# Patient Record
Sex: FEMALE | Race: WHITE
[De-identification: names, ages, dates, MRNs, and addresses within clinical notes are randomized per-mention and may not be internally consistent; named-entity substitution may affect disease eponyms.]

---

## 2018-04-20 NOTE — EDM.PDOC
ED HPI GENERAL MEDICAL PROBLEM





- General


Chief Complaint: Abdominal Pain


Stated Complaint: ABDOMINAL PAIN


Time Seen by Provider: 18 07:31


Source of Information: Reports: Patient


History Limitations: Reports: No Limitations





- History of Present Illness


INITIAL COMMENTS - FREE TEXT/NARRATIVE: 





The patient states that she was seen by her PCP on 3/29/2018 for an annual 

checkup, but was also with a complaint of pain across her upper abdomen and 

down the left side for several months. She states that she was prescribed two 

medicines - the names of which she does not recall - that she took 2 doses of, 

total. These medicines were prescribed "to heal an ulcer plus something to 

protect my tummy". She underwent a CT scan of the abdomen and pelvis with oral 

contrast only on 2018, which reportedly demonstrated gallstones. She states 

that she was notified of this by the nurse for her PCP, who told her that she 

would need to have her gallbladder removed. She was referred to see a surgeon 

at [facility] this coming Tuesday, 2018. She states that when she was 

told that she has gallstones, she did not see a need to continue the medicines 

that she was prescribed.





A copy of the CT scan of her abdomen with oral contrast, dated 2018 has 

been obtained. It finds:


1. Cholelithiasis.


2. Diffuse fatty infiltration of the liver.


3. 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





There is no mention of findings such as diverticulitis, pericholecystic fluid, 

dilated common bile duct, free air, etc.





She now presents with continuation of pain across her upper abdomen and down 

the left side. No nausea, emesis, constipation, or urinary symptoms. She states 

that she has frequent watery diarrhea, normal for her. No recent fever.





The patient's PCP is Brittney Sam.








  ** Bilateral Upper Abdomen


Pain Score (Numeric/FACES): 8





- Related Data


 Allergies











Allergy/AdvReac Type Severity Reaction Status Date / Time


 


Iodinated Contrast- Oral and Allergy  Hives Verified 18 07:25





IV Dye     











Home Meds: 


 Home Meds





traZODone 50 mg PO DAILY 16 [History]











Past Medical History


HEENT History: Reports: Impaired Vision


Cardiovascular History: Reports: High Cholesterol (untreated)


Psychiatric History: Reports: Other (See Below) (Insomnia)





- Past Surgical History


HEENT Surgical History: Reports: Oral Surgery ([place] teeth extraction), 

Tonsillectomy


GI Surgical History: Reports: Appendectomy


Female  Surgical History: Reports:  Section (x 2), Hysterectomy, 

Nephrectomy (left, secondary to being non-functional)





Social & Family History





- Tobacco Use


Smoking Status *Q: Current Every Day Smoker


Years of Tobacco use: 34


Packs/Tins Daily: 0.5





- Caffeine Use


Caffeine Use: Reports: None





- Alcohol Use


Alcohol Use History: Yes


Days Per Week of Alcohol Use: 7


Number of Drinks Per Day: 4


Total Drinks Per Week: 28


Alcohol Use Frequency: Daily





- Recreational Drug Use


Recreational Drug Use: Yes


Drug Use in Last 12 Months: No


Recreational Drug Type: Reports: Marijuana/Hashish (none since late )





- Living Situation & Occupation


Living situation: Reports: , with Family (2 kids)


Occupation: Employed (Customer service at [employer])





ED ROS GENERAL





- Review of Systems


Review Of Systems: ROS reveals no pertinent complaints other than HPI.





ED EXAM, GI/ABD





- Physical Exam


Exam: See Below


Exam Limited By: No Limitations


General Appearance: Alert, WD/WN, No Apparent Distress


Eyes: Bilateral: Normal Appearance, EOMI


Ears: Normal External Exam, Hearing Grossly Normal


Nose: Normal Inspection, No Blood


Throat/Mouth: Normal Inspection, Normal Lips, Normal Voice, No Airway Compromise


Head: Atraumatic, Normocephalic


Neck: Normal Inspection, Full Range of Motion


Respiratory/Chest: No Respiratory Distress, Lungs Clear, Normal Breath Sounds, 

No Accessory Muscle Use


Cardiovascular: Normal Peripheral Pulses, Regular Rate, Rhythm, No Edema, No 

Gallop, No JVD, No Murmur, No Rub


GI/Abdominal Exam: Normal Bowel Sounds, Soft, No Organomegaly, No Distention, 

No Abnormal Bruit, No Mass, Tender (Primarily to the left lower quadrant and 

somewhat to the suprapubic area, right lower quadrant and epigastrium. Minimal 

tenderness to the right upper quadrant, and nontender elsewhere.)


 (Female) Exam: Deferred


Rectal (Female) Exam: Deferred


Back Exam: Normal Inspection, Full Range of Motion, Other (Well-healed 

nephrectomy scar on the left flank).  No: CVA Tenderness (L), CVA Tenderness (R)


Extremities: Normal Inspection, Normal Range of Motion, No Pedal Edema, Normal 

Capillary Refill


Neurological: Alert, Oriented, Normal Cognition, No Motor/Sensory Deficits


Psychiatric: Normal Affect


Skin Exam: Warm, Dry, Intact, Normal Color, No Rash





Course





- Vital Signs


Last Recorded V/S: 


 Last Vital Signs











Temp  36.2 C   18 07:22


 


Pulse  101 H  18 07:22


 


Resp  18   18 07:22


 


BP  126/86   18 07:22


 


Pulse Ox  94 L  18 07:22














- Orders/Labs/Meds


Orders: 


 Active Orders 24 hr











 Category Date Time Status


 


 UA W/MICROSCOPIC [URIN] Stat Lab  18 08:10 Ordered











Labs: 


 Laboratory Tests











  18 Range/Units





  08:10 08:15 08:15 


 


WBC   5.93   (3.98-10.04)  K/mm3


 


RBC   4.56   (3.98-5.22)  M/mm3


 


Hgb   14.4   (11.2-15.7)  gm/L


 


Hct   43.3   (34.1-44.9)  %


 


MCV   95.0 H   (79.4-94.8)  fl


 


MCH   31.6   (25.6-32.2)  pg


 


MCHC   33.3   (32.2-35.5)  g/dl


 


RDW Std Deviation   44.6   (36.4-46.3)  fL


 


Plt Count   284   (182-369)  K/mm3


 


MPV   9.4   (9.4-12.3)  fl


 


Neutrophils % (Manual)   54   (40-60)  %


 


Band Neutrophils %   0   (0-10)  %


 


Lymphocytes % (Manual)   40   (20-40)  %


 


Atypical Lymphs %   0   %


 


Monocytes % (Manual)   3   (2-10)  %


 


Eosinophils % (Manual)   2   (0.7-5.8)  %


 


Basophils % (Manual)   1   (0.1-1.2)  


 


Platelet Estimate   Adequate   


 


RBC Morph Comment   Normal   


 


Sodium    141  (136-145)  mEq/L


 


Potassium    4.3  (3.5-5.1)  mEq/L


 


Chloride    105  ()  mEq/L


 


Carbon Dioxide    26  (21-32)  mEq/L


 


Anion Gap    14.3  (5-15)  


 


BUN    15  (7-18)  mg/dL


 


Creatinine    0.9  (0.55-1.02)  mg/dL


 


Est Cr Clr Drug Dosing    63.14  mL/min


 


Estimated GFR (MDRD)    > 60  (>60)  mL/min


 


BUN/Creatinine Ratio    16.7  (14-18)  


 


Glucose    108 H  ()  mg/dL


 


Calcium    9.3  (8.5-10.1)  mg/dL


 


Total Bilirubin    0.3  (0.2-1.0)  mg/dL


 


AST    27  (15-37)  U/L


 


ALT    38  (14-59)  U/L


 


Alkaline Phosphatase    49  ()  U/L


 


Total Protein    7.3  (6.4-8.2)  g/dl


 


Albumin    3.9  (3.4-5.0)  g/dl


 


Globulin    3.4  gm/dL


 


Albumin/Globulin Ratio    1.2  (1-2)  


 


Lipase    172  ()  U/L


 


Urine Color  Yellow    (Yellow)  


 


Urine Appearance  Clear    (Clear)  


 


Urine pH  5.5    (5.0-8.0)  


 


Ur Specific Gravity  > or = 1.030    (1.005-1.030)  


 


Urine Protein  Negative    (Negative)  


 


Urine Glucose (UA)  Negative    (Negative)  


 


Urine Ketones  Negative    (Negative)  


 


Urine Occult Blood  Trace-intact H    (Negative)  


 


Urine Nitrite  Negative    (Negative)  


 


Urine Bilirubin  Negative    (Negative)  


 


Urine Urobilinogen  0.2    (0.2-1.0)  


 


Ur Leukocyte Esterase  Negative    (Negative)  


 


Urine RBC  5-10 H    (0-5)  /hpf


 


Urine WBC  0-5    (0-5)  /hpf


 


Ur Epithelial Cells  0-5    (0-5)  /hpf


 


Urine Bacteria  Few    (FEW)  /hpf


 


Urine Mucus  Few    (FEW)  /hpf














- Re-Assessments/Exams


Free Text/Narrative Re-Assessment/Exam: 





18 09:23


Test results discussed with the patient. Today's CBC, CMP, lipase level, and 

urinalysis are all completely normal. Based on these test results, her current 

physical examination, in addition to the previous CT scan, I do not see an 

indication for repeat CT scan at this time. I'm recommending that the patient 

follow-up with the surgeon at her previously scheduled appointment this coming 

Tuesday, 2018. The patient is agreeable.





Departure





- Departure


Time of Disposition: 09:24


Disposition: Home, Self-Care 01


Condition: Good


Clinical Impression: 


 Chronic abdominal pain








- Discharge Information


Referrals: 


Brittney Sam PA-C [Primary Care Provider] - 


Forms:  ED Department Discharge


Additional Instructions: 


You were seen in the emergency room for continued upper and left-sided 

abdominal pain.





Workup in the ER included blood work and a urinalysis, all of which were 

completely normal. There is no sign of infection. You do not have pancreatitis. 

Your electrolytes are normal, and your kidney function is normal. You do not 

have a urinary tract infection.





Because you had an unremarkable CT scan on 2018, and based on your history 

and current physical examination, a repeat CT scan was not recommended.





We recommend that you follow-up with the surgeon that you are currently 

scheduled to see this coming Tuesday, 2018.





If any other problems, please do not hesitate to return to the ER.





- My Orders


Last 24 Hours: 


My Active Orders





18 08:10


UA W/MICROSCOPIC [URIN] Stat 














- Assessment/Plan


Last 24 Hours: 


My Active Orders





18 08:10


UA W/MICROSCOPIC [URIN] Stat

## 2019-02-12 NOTE — EDM.PDOC
ED HPI GENERAL MEDICAL PROBLEM





- General


Chief Complaint: Abdominal Pain


Stated Complaint: LOWER ABDOMINAL PAIN


Time Seen by Provider: 19 23:36


Source of Information: Reports: Patient


History Limitations: Reports: No Limitations





- History of Present Illness


INITIAL COMMENTS - FREE TEXT/NARRATIVE: 


53-year-old female presents to the ED after wakening from sleep with severe 

lower abdominal pain and rectal pressure discomfort like she had to have a 

bowel movement but was unable to do so. States she ate a hamburger about 1930 

hrs. last evening and thought perhaps to meet didn't taste quite right. She 

thought maybe a hamburger was in the fridge for to long. She's had no nausea 

vomiting although she was quite nauseated when the pain was very intense. At 

present the pain has dissipated nearly completely and she states now she wishes 

she would not come to the hospital. Placement us ace smell of alcohol on her 

breath. Previous appendectomy and cholecystectomy. History of past problems 

with constipation. She states normally her bowels are moving twice a day 

however.





Onset: Today


Onset Date: 19


Onset Time: 22:35


Duration: Minutes:


Location: Reports: Abdomen (Diffuse lower abdominal strong cramping pain with 

rectal pressure discomfort that is now eased up.)


Quality: Reports: Ache, Pressure, Sharp, Stabbing, Other (: Colicky component 

to the pain)


Severity: Severe (Pain was reportedly tender to 10 but is now gone)


Improves with: Reports: Other (Improves spontaneously.)


Worsens with: Reports: None


Context: Denies: Activity, Exercise, Lifting, Sick Contact, Trauma, Other


Associated Symptoms: Reports: Nausea/Vomiting.  Denies: No Other Symptoms, 

Confusion, Chest Pain, Cough, cough w sputum, Diaphoresis, Fever/Chills, 

Headaches, Loss of Appetite, Malaise, Rash, Seizure (Nausea without vomiting), 

Shortness of Breath, Syncope


Treatments PTA: Reports: Other (see below) (None.)


  ** Lower Abdomen


Pain Score (Numeric/FACES): 10





- Related Data


 Allergies











Allergy/AdvReac Type Severity Reaction Status Date / Time


 


Iodinated Contrast- Oral and Allergy  Hives Verified 19 23:28





IV Dye     











Home Meds: 


 Home Meds





traZODone 50 mg PO DAILY 16 [History]











Past Medical History


HEENT History: Reports: Impaired Vision


Cardiovascular History: Reports: High Cholesterol


Musculoskeletal History: Reports: Arthritis


Neurological History: Reports: Other (See Below)


Psychiatric History: Reports: Other (See Below)





- Past Surgical History


HEENT Surgical History: Reports: Oral Surgery, Tonsillectomy


GI Surgical History: Reports: Appendectomy


Female  Surgical History: Reports:  Section, Hysterectomy, Nephrectomy





Social & Family History





- Caffeine Use


Caffeine Use: Reports: None





- Living Situation & Occupation


Living situation: Reports: , with Family (2 kids)


Occupation: Employed (Customer service at [employer])





ED ROS GENERAL





- Review of Systems


Review Of Systems: See Below


Constitutional: Denies: Fever, Chills, Malaise, Weakness, Fatigue, Weight Loss


HEENT: Reports: No Symptoms


Respiratory: Reports: Shortness of Breath, Wheezing (On exertion), Cough ( 

occasional wheezing she is a smokeroccasional cough )


Cardiovascular: Reports: No Symptoms


Endocrine: Reports: No Symptoms


GI/Abdominal: Reports: Abdominal Pain, Constipation (See history of present 

illness [place] problems with constipation)


: Reports: No Symptoms


Musculoskeletal: Reports: No Symptoms


Skin: Reports: No Symptoms


Neurological: Reports: Other (Chronic insomnia)


Hematologic/Lymphatic: Reports: No Symptoms


Immunologic: Reports: No Symptoms





ED EXAM, GI/ABD





- Physical Exam


Exam: See Below


Exam Limited By: No Limitations


General Appearance: Alert, WD/WN, No Apparent Distress, Anxious, Mild Distress, 

Other (But prefers to stay curled up in the fetal position and won't go to 

sleep. Of note she did take trazodone before going to bed tonight)


Eyes: Bilateral: Normal Appearance


Respiratory/Chest: Lungs Clear, Normal Breath Sounds, No Accessory Muscle Use, 

Respiratory Distress, Decreased Breath Sounds (Breath sounds are slightly 

diminished in both lower lung fields compatible with mild emphysema changes.)


Cardiovascular: Normal Peripheral Pulses, Regular Rate, Rhythm, No Edema, No 

Murmur, No Rub


GI/Abdominal Exam: Tender, Abnormal Bowel Sounds (Hyperactive bowel sounds in 

all 4 quadrants.), Other (Evidence of previous cholecystectomy and open 

appendectomy).  No: Guarding, Rigid (Slight tenderness suprapubically and right 

lower quadrant with no guarding or rebound), Rebound


Back Exam: Normal Inspection, Full Range of Motion.  No: CVA Tenderness (L), 

CVA Tenderness (R)


Extremities: Normal Inspection, Normal Range of Motion, Non-Tender, No Pedal 

Edema


Neurological: Alert, Oriented, CN II-XII Intact, Normal Cognition


Psychiatric: Normal Affect, Normal Mood


Skin Exam: Warm, Dry, Intact, Normal Color, No Rash





Course





- Vital Signs


Last Recorded V/S: 


 Last Vital Signs











Temp  36.2 C   19 23:23


 


Pulse  93   19 23:23


 


Resp  20   19 23:23


 


BP  137/81   19 23:23


 


Pulse Ox  93 L  19 23:23














- Orders/Labs/Meds


Meds: 


Medications














Discontinued Medications














Generic Name Dose Route Start Last Admin





  Trade Name Debbie  PRN Reason Stop Dose Admin


 


Dicyclomine HCl  20 mg  19 00:08  19 00:16





  Bentyl  PO  19 00:09  20 mg





  ONETIME ONE   Administration





     





     





     





     


 


Magnesium Citrate  150 ml  19 00:14  19 00:25





  Citrate Of Magnesia  PO  19 00:15  150 ml





  ONETIME ONE   Administration





     





     





     





     














- Radiology Interpretation


Free Text/Narrative:: 


53-year-old female presents to the ED after wakening from sleep with severe 

lower abdominal pain with rectal pressure discomfort. She felt like she had to 

have a bowel movement but was unable to do so. Pain was intense strongly 

colicky and is subsequently eased up since she came to the ED. Examination 

reveals a benign abdomen with hyperactive bowel sounds in all 4 quadrants. 

Suspect problems with constipation. Plan KUB and urinalysis to be obtained








- Re-Assessments/Exams


Free Text/Narrative Re-Assessment/Exam: 





19 00:09 came in the reveals a large amount of air throughout the colon 

ends a few clumps of stool throughout the colon and particularly down the 

rectal vault. It is  not really bad enough to warrant bowel cleanse. Going to 

give her Bentyl 20 mg by mouth that she does have some mild colicky pain at 

this time. I think if she sleeps well with relief of abdominal pain to pass a 

good portion of this air out of her colon and be fine in the morning. I will 

send her home with Citroma to take 5 ounces in the morning with 5 ounces of 

juice of choice to provide bowel cleanse.











Departure





- Departure


Time of Disposition: 00:12


Disposition: Home, Self-Care 01


Condition: Fair


Clinical Impression: 


 Constipation by delayed colonic transit





Abdominal pain


Qualifiers:


 Abdominal location: lower abdomen, unspecified Qualified Code(s): R10.30 - 

Lower abdominal pain, unspecified








- Discharge Information


*PRESCRIPTION DRUG MONITORING PROGRAM REVIEWED*: Not Applicable


*COPY OF PRESCRIPTION DRUG MONITORING REPORT IN PATIENT BRODY: Not Applicable


Instructions:  Constipation, Adult


Referrals: 


Brittney Sam PA-C [Primary Care Provider] - 


Forms:  ED Department Discharge, ED Return to Work/School Form


Additional Instructions: 


Evaluation the emergency room tonight in regards to development of acute lower 

abdominal cramping pain with a feeling of need to defecate but unable to do so. 

By the time he arrived in the ED and were seen the pain had pretty well 

resolved. However it is coming back mildly. Examination reveals a benign 

abdomen with very frequent bowel sounds. X-ray of the abdomen reveals a large 

amount of air distending the colon with collapse of stool throughout the colon 

compatible with mild constipation. At this time I would suggest Bentyl 20 mg by 

mouth which was given in the ED to relieve abdominal cramping pain. May use 

Citroma in the morning or magnesium citrate 5 ounces by mouth mixed with 5 

ounces of juice of choice to provide further bowel cleanse. Note given to 

excuse her from the workplace today due to current illness.

## 2019-02-13 NOTE — CR
Abdomen: Supine view of the abdomen was obtained.

 

Comparison: No prior abdominal plain film, previous CT abdomen and 

pelvis exam of 09/02/09.

 

Absent left kidney shadow is noted.  Surgical clips are seen within 

the upper right abdomen.  This presumably is from previous 

cholecystectomy.  Calcifications are seen within the pelvis which are 

compatible with phleboliths.  Bowel gas pattern is normal.  Bony 

structures are within normal limits for the patient's age.

 

Impression:

1.  Incidental findings as noted above.  Nothing acute is seen on 

supine abdominal x-ray.

 

Diagnostic code #2

## 2019-09-18 ENCOUNTER — HOSPITAL ENCOUNTER (EMERGENCY)
Dept: HOSPITAL 41 - JD.ED | Age: 54
Discharge: HOME | End: 2019-09-18
Payer: MEDICAID

## 2019-09-18 VITALS — HEART RATE: 130 BPM | SYSTOLIC BLOOD PRESSURE: 116 MMHG | DIASTOLIC BLOOD PRESSURE: 71 MMHG

## 2019-09-18 DIAGNOSIS — Z88.8: ICD-10-CM

## 2019-09-18 DIAGNOSIS — F17.210: ICD-10-CM

## 2019-09-18 DIAGNOSIS — Z90.710: ICD-10-CM

## 2019-09-18 DIAGNOSIS — Z98.890: ICD-10-CM

## 2019-09-18 DIAGNOSIS — Z88.1: ICD-10-CM

## 2019-09-18 DIAGNOSIS — Z91.041: ICD-10-CM

## 2019-09-18 DIAGNOSIS — Z79.899: ICD-10-CM

## 2019-09-18 DIAGNOSIS — Z90.5: ICD-10-CM

## 2019-09-18 DIAGNOSIS — J40: Primary | ICD-10-CM

## 2019-09-18 PROCEDURE — 94640 AIRWAY INHALATION TREATMENT: CPT

## 2019-09-18 PROCEDURE — 71046 X-RAY EXAM CHEST 2 VIEWS: CPT

## 2019-09-18 PROCEDURE — 99285 EMERGENCY DEPT VISIT HI MDM: CPT

## 2019-09-18 NOTE — CR
Chest:  Two views of the chest were obtained.

 

Comparison: No prior chest imaging.

 

Heart size and mediastinum are normal.  Lungs are clear.  Minimal 

scoliosis is noted within the spine.  Surgical clips are noted within 

the upper right abdomen from previous cholecystectomy.

 

Impression:

1.  Incidental findings.  Nothing acute is seen.

 

Diagnostic code #2

## 2019-09-18 NOTE — EDM.PDOC
ED HPI GENERAL MEDICAL PROBLEM





- General


Chief Complaint: Respiratory Problem


Stated Complaint: cough


Time Seen by Provider: 19 00:13


Source of Information: Reports: Patient


History Limitations: Reports: No Limitations





- History of Present Illness


INITIAL COMMENTS - FREE TEXT/NARRATIVE: 





The patient has had a cough for over 2 weeks.  This all started the  with ear pressure and sore throat and it advanced to a cough.  She 

[facility] and given a Z-elizabeth.  She did not get better so 

she saw her doctor in the clinic and she was given another antibiotic, steroids 

and albuterol.  That still has not helped.  She has some shortness of breath.  

She does smoke.  She does have some left sided chest pain from coughing.  She 

has no abdominal pain, nausea or vomiting.


Onset: Gradual


Duration: Week(s):


Location: Reports: Chest


Quality: Reports: Sharp


Severity: Moderate


Improves with: Reports: None


Worsens with: Reports: Other (cough)


Associated Symptoms: Reports: Chest Pain, Cough, Shortness of Breath.  Denies: 

Fever/Chills, Headaches, Nausea/Vomiting


  ** left rib cage


Pain Score (Numeric/FACES): 8





- Related Data


 Allergies











Allergy/AdvReac Type Severity Reaction Status Date / Time


 


amoxicillin Allergy  Rash Verified 19 00:15


 


hydrocortisone Allergy  Rash Verified 19 00:15


 


Iodinated Contrast Media Allergy  Hives Verified 19 23:28





[Iodinated Contrast- Oral     





and IV Dye]     











Home Meds: 


 Home Meds





traZODone 50 mg PO DAILY 16 [History]


Celecoxib [CeleBREX]  19 [History]


Codeine/Promethazine [Phenergan with Codeine] 10 ml PO Q4HR PRN #300 ml  [Rx]


FLUoxetine [PROzac]  19 [History]











Past Medical History


HEENT History: Reports: Impaired Vision


Cardiovascular History: Reports: High Cholesterol


Musculoskeletal History: Reports: Arthritis


Neurological History: Reports: Other (See Below)


Psychiatric History: Reports: Other (See Below)





- Past Surgical History


HEENT Surgical History: Reports: Oral Surgery, Tonsillectomy


GI Surgical History: Reports: Appendectomy


Female  Surgical History: Reports:  Section, Hysterectomy, Nephrectomy





Social & Family History





- Tobacco Use


Smoking Status *Q: Current Every Day Smoker


Years of Tobacco use: 30


Packs/Tins Daily: 0.5





- Caffeine Use


Caffeine Use: Reports: None





- Living Situation & Occupation


Living situation: Reports: , with Family (2 kids)


Occupation: Employed (Customer service at [employer])





ED ROS GENERAL





- Review of Systems


Review Of Systems: See Below


Constitutional: Reports: No Symptoms


HEENT: Reports: No Symptoms


Respiratory: Reports: Shortness of Breath, Cough


Cardiovascular: Reports: No Symptoms


Endocrine: Reports: No Symptoms


GI/Abdominal: Reports: No Symptoms


: Reports: No Symptoms


Musculoskeletal: Reports: No Symptoms


Skin: Reports: No Symptoms





ED EXAM, GENERAL





- Physical Exam


Exam: See Below


Exam Limited By: No Limitations


General Appearance: Alert, No Apparent Distress


Ears: Normal External Exam


Nose: Normal Inspection


Head: Atraumatic, Normocephalic


Neck: Normal Inspection


Respiratory/Chest: No Respiratory Distress, Decreased Breath Sounds, Wheezing


Cardiovascular: Regular Rate, Rhythm, No Edema, No Murmur


GI/Abdominal: Soft, Non-Tender, No Organomegaly, No Mass


Back Exam: Normal Inspection


Extremities: Normal Inspection


Neurological: Alert, Oriented, No Motor/Sensory Deficits





Course





- Vital Signs


Last Recorded V/S: 


 Last Vital Signs











Temp  98.5 F   19 00:12


 


Pulse  130 H  19 00:12


 


Resp  18   19 00:12


 


BP  116/71   19 00:12


 


Pulse Ox  94 L  19 00:26














- Orders/Labs/Meds


Orders: 


 Active Orders 24 hr











 Category Date Time Status


 


 RT Aerosol Therapy [RC] ASDIRECTED Care  19 00:26 Active


 


 CXR [Chest 2V] [CR] Stat Exams  19 00:27 Taken











Meds: 


Medications














Discontinued Medications














Generic Name Dose Route Start Last Admin





  Trade Name Freq  PRN Reason Stop Dose Admin


 


Albuterol/Ipratropium  3 ml  19 00:26  19 00:36





  Duoneb 3.0-0.5 Mg/3 Ml  NEB  19 00:27  3 ml





  ONETIME ONE   Administration





     





     





     





     


 


Promethazine HCl/Codeine  10 ml  19 00:26  19 00:31





  Phenergan With Codeine  PO  19 00:27  10 ml





  ONETIME ONE   Administration





     





     





     





     














- Re-Assessments/Exams


Free Text/Narrative Re-Assessment/Exam: 





19 00:37


I ordered a CXR, labs, duoneb and phenergan with codeine.


19 00:52


Her CXR looks good.  I will discharge her home on some phenergan with codeine.





Departure





- Departure


Time of Disposition: 01:00


Disposition: Home, Self-Care 01


Condition: Good


Clinical Impression: 


 Bronchitis








- Discharge Information


*PRESCRIPTION DRUG MONITORING PROGRAM REVIEWED*: No


*COPY OF PRESCRIPTION DRUG MONITORING REPORT IN PATIENT BRODY: No


Prescriptions: 


Codeine/Promethazine [Phenergan with Codeine] 10 ml PO Q4HR PRN #300 ml


 PRN Reason: Cough


Referrals: 


Brittney Sam PA-C [Primary Care Provider] - 1 Week


Forms:  ED Department Discharge


Additional Instructions: 


Keep taking your antibiotic.  Take the phenergan with codiene every 4 to 6 

hours as needed for cough.  Use the albuterol inhaler 2 puffs every 4 to 6 

hours as needed for shortness of breath.  Please return if you are worse.  Stop 

smoking.





- My Orders


Last 24 Hours: 


My Active Orders





19 00:26


RT Aerosol Therapy [RC] ASDIRECTED 





19 00:27


CXR [Chest 2V] [CR] Stat 














- Assessment/Plan


Last 24 Hours: 


My Active Orders





19 00:26


RT Aerosol Therapy [RC] ASDIRECTED 





19 00:27


CXR [Chest 2V] [CR] Stat

## 2020-02-25 ENCOUNTER — HOSPITAL ENCOUNTER (EMERGENCY)
Dept: HOSPITAL 41 - JD.ED | Age: 55
Discharge: NURSING, REHAB OR LONG-TERM CARE | End: 2020-02-25
Payer: MEDICAID

## 2020-02-25 VITALS — DIASTOLIC BLOOD PRESSURE: 92 MMHG | SYSTOLIC BLOOD PRESSURE: 119 MMHG

## 2020-02-25 VITALS — HEART RATE: 91 BPM

## 2020-02-25 DIAGNOSIS — R94.31: ICD-10-CM

## 2020-02-25 DIAGNOSIS — I21.4: Primary | ICD-10-CM

## 2020-02-25 DIAGNOSIS — F17.210: ICD-10-CM

## 2020-02-25 DIAGNOSIS — R73.9: ICD-10-CM

## 2020-02-25 DIAGNOSIS — Z91.041: ICD-10-CM

## 2020-02-25 DIAGNOSIS — Z88.0: ICD-10-CM

## 2020-02-25 PROCEDURE — 84484 ASSAY OF TROPONIN QUANT: CPT

## 2020-02-25 PROCEDURE — 85730 THROMBOPLASTIN TIME PARTIAL: CPT

## 2020-02-25 PROCEDURE — 96375 TX/PRO/DX INJ NEW DRUG ADDON: CPT

## 2020-02-25 PROCEDURE — 71046 X-RAY EXAM CHEST 2 VIEWS: CPT

## 2020-02-25 PROCEDURE — 80053 COMPREHEN METABOLIC PANEL: CPT

## 2020-02-25 PROCEDURE — 83735 ASSAY OF MAGNESIUM: CPT

## 2020-02-25 PROCEDURE — 99285 EMERGENCY DEPT VISIT HI MDM: CPT

## 2020-02-25 PROCEDURE — 96368 THER/DIAG CONCURRENT INF: CPT

## 2020-02-25 PROCEDURE — 85379 FIBRIN DEGRADATION QUANT: CPT

## 2020-02-25 PROCEDURE — 36415 COLL VENOUS BLD VENIPUNCTURE: CPT

## 2020-02-25 PROCEDURE — 93005 ELECTROCARDIOGRAM TRACING: CPT

## 2020-02-25 PROCEDURE — 84443 ASSAY THYROID STIM HORMONE: CPT

## 2020-02-25 PROCEDURE — 96365 THER/PROPH/DIAG IV INF INIT: CPT

## 2020-02-25 PROCEDURE — 96376 TX/PRO/DX INJ SAME DRUG ADON: CPT

## 2020-02-25 PROCEDURE — 85025 COMPLETE CBC W/AUTO DIFF WBC: CPT

## 2020-02-25 NOTE — EDM.PDOC
ED HPI GENERAL MEDICAL PROBLEM





- General


Chief Complaint: Cardiovascular Problem


Stated Complaint: RACING HEART


Time Seen by Provider: 20 16:04


Source of Information: Reports: Patient


History Limitations: Reports: No Limitations





- History of Present Illness


INITIAL COMMENTS - FREE TEXT/NARRATIVE: 





Ms. Curiel is a very pleasant 54-year-old woman with a past medical history 

significant for untreated dyslipidemia, insomnia, and hepatic steatosis, who 

now presents to the ED stating that she has had rapid palpitations for the past 

2 or 3 days, whenever she moves around or gets up.  No chest pain.  She also 

reports feeling warm or cold at times, including hot flashes with diaphoresis 

on and off for the past few years, especially for the past 2 nights.  She is 

having a hot flash here in the ED, but states that most of her hot flashes 

occur at night.  She also relates dyspnea that she believes is due to anxiety, 

and lightheadedness.  She also relates epigastric burning, but only when she 

gets up and moves around.





The patient states that she has had similar symptoms, about twice a year over 

the past 2 years.  She is status post a partial hysterectomy.  She states that 

her PCP told her that her hot flashes were not due to menopause, therefore the 

patient is not on hormone replacement therapy.





Here in the ED, the patient is found to be tachycardic at 145 bpm, but is 

otherwise hemodynamically stable, saturating 97% on room air.








The patient's PCP is SOILA Reddy.


She is unsure if she received an influenza vaccine this season, but agreed to 

receive one here today.











- Related Data


 Allergies











Allergy/AdvReac Type Severity Reaction Status Date / Time


 


amoxicillin Allergy  Rash Verified 20 16:08


 


hydrocortisone Allergy  Rash Verified 20 16:08


 


Iodinated Contrast Media Allergy  Hives Verified 20 16:08





[Iodinated Contrast- Oral     





and IV Dye]     











Home Meds: 


 Home Meds





traZODone 50 mg PO DAILY 16 [History]











Past Medical History


HEENT History: Reports: Impaired Vision


Cardiovascular History: Reports: High Cholesterol (untreated)


Gastrointestinal History: Reports: Fatty Liver


Psychiatric History: Reports: None, Other (See Below) (Insomnia)





- Past Surgical History


HEENT Surgical History: Reports: Oral Surgery (wisdom teeth extraction), 

Tonsillectomy


GI Surgical History: Reports: Appendectomy, Cholecystectomy (2018)


Female  Surgical History: Reports:  Section (x 2), Hysterectomy (

partial), Nephrectomy (left, 2dary to non-funtioning kidney)





Social & Family History





- Tobacco Use


Smoking Status *Q: Current Every Day Smoker


Years of Tobacco use: 39


Packs/Tins Daily: 0.5





- Caffeine Use


Caffeine Use: Reports: None





- Alcohol Use


Alcohol Use History: Yes


Alcohol Use Frequency: Daily





- Recreational Drug Use


Recreational Drug Use: Yes


Drug Use in Last 12 Months: No


Recreational Drug Type: Reports: Cocaine (last snorted around ), Marijuana/

Hashish (last smoked around )





- Living Situation & Occupation


Living situation: Reports: , with Family (Son)


Occupation: Unemployed





ED ROS GENERAL





- Review of Systems


Review Of Systems: Comprehensive ROS is negative, except as noted in HPI.





ED EXAM, GENERAL





- Physical Exam


Exam: See Below


Exam Limited By: No Limitations


General Appearance: Alert, Anxious (when having a hot flash), Thin


Eye Exam: Bilateral Eye: EOMI, Normal Inspection


Ears: Normal External Exam, Hearing Grossly Normal


Nose: Normal Inspection


Throat/Mouth: Normal Inspection, Normal Lips, Normal Voice, No Airway Compromise


Head: Atraumatic, Normocephalic


Neck: Normal Inspection, Full Range of Motion


Respiratory/Chest: No Respiratory Distress, Lungs Clear, Normal Breath Sounds, 

No Accessory Muscle Use.  No: Decreased Breath Sounds, Crackles, Rhonchi, 

Wheezing, Stridor, Prolonged Expiration


Cardiovascular: Normal Peripheral Pulses, No Edema, No Gallop, No JVD, No Murmur

, No Rub, Tachycardia (regular), Other (Narrowly split S3 that fuses with 

Valsalva)


Peripheral Pulses: 4+: Radial (L), Radial (R)


GI/Abdominal: Normal Bowel Sounds, Soft, Non-Tender (including the epigastrium)

, No Organomegaly, No Distention, No Abnormal Bruit, No Mass


 (Female) Exam: Deferred


Rectal (Female) Exam: Deferred


Back Exam: Normal Inspection, Full Range of Motion, NT


Extremities: Normal Inspection, Normal Range of Motion, No Pedal Edema, Normal 

Capillary Refill


Neurological: Alert, Oriented, Normal Cognition, No Motor/Sensory Deficits


Psychiatric: Anxious


Skin Exam: Warm, Dry, Intact, Normal Color, No Rash





EKG INTERPRETATION


EKG Date: 20


Time: 16:05


Rhythm: Other (Sinus tachycardia)


Rate (Beats/Min): 113


Axis: Normal


P-Wave: Present


QRS: Normal


ST-T: Other (No ST elevations or depressions, but diffuse T wave inversions)


QT: Prolonged (QTc 504 ms)


Comparison: NA - No Prior EKG





Course





- Vital Signs


Last Recorded V/S: 


 Last Vital Signs











Temp      


 


Pulse  126 H  20 17:59


 


Resp  16   20 16:03


 


BP  120/90   20 17:59


 


Pulse Ox  97   20 16:03








 





Orthostatic Blood Pressure [     87/71


Standing]                        


Orthostatic Blood Pressure [     106/86


Sitting]                         


Orthostatic Blood Pressure [     120/75


Supine]                          











- Orders/Labs/Meds


Orders: 


 Active Orders 24 hr











 Category Date Time Status


 


 EKG 12 Lead [EKG Documentation Completion] [RC] STAT Care  20 16:06 

Active


 


 Orthostatic Vital Signs [RC] STAT Care  20 16:28 Active


 


 Chest 2V [CR] Stat Exams  20 16:27 Taken


 


 Heparin Sodium/D5W [Heparin 25,000 Units in D5W 500 ML] Med  20 18:00 

Active





 25,000 units in 500 ml IV TITRATE   


 


 Magnesium Sulfate/Water [Magnesium Sulfate in Water Med  20 18:16 Active





 Premix] 2 gm   





 Premix Bag 1 bag   





 IV ONETIME   








 Medication Orders





Heparin Sodium/Dextrose (Heparin 25,000 Units In D5w 500 Ml)  25,000 units in 

500 mls @ 13.92 mls/hr IV TITRATE DENNIS; Protocol


   Last Admin: 20 18:15  Dose: 696 units/hr, 13.92 mls/hr


Magnesium Sulfate 2 gm/ Premix  50 mls @ 50 mls/hr IV ONETIME STA


   Stop: 20 19:15


   Last Admin: 20 18:30  Dose: 50 mls/hr








Labs: 


 Laboratory Tests











  20 Range/Units





  16:10 16:50 16:50 


 


WBC  10.61 H    (3.98-10.04)  K/mm3


 


RBC  5.65 H    (3.98-5.22)  M/mm3


 


Hgb  18.5 H D    (11.2-15.7)  gm/dl


 


Hct  52.6 H    (34.1-44.9)  %


 


MCV  93.1    (79.4-94.8)  fl


 


MCH  32.7 H    (25.6-32.2)  pg


 


MCHC  35.2    (32.2-35.5)  g/dl


 


RDW Std Deviation  43.6    (36.4-46.3)  fL


 


Plt Count  325    (182-369)  K/mm3


 


MPV  10.6    (9.4-12.3)  fl


 


Neut % (Auto)  70.1    (34.0-71.1)  %


 


Lymph % (Auto)  19.6    (19.3-51.7)  %


 


Mono % (Auto)  9.3    (4.7-12.5)  %


 


Eos % (Auto)  0.4 L    (0.7-5.8)  


 


Baso % (Auto)  0.3    (0.1-1.2)  %


 


Neut # (Auto)  7.44 H    (1.56-6.13)  K/mm3


 


Lymph # (Auto)  2.08    (1.18-3.74)  K/mm3


 


Mono # (Auto)  0.99 H    (0.24-0.36)  K/mm3


 


Eos # (Auto)  0.04    (0.04-0.36)  K/mm3


 


Baso # (Auto)  0.03    (0.01-0.08)  K/mm3


 


Manual Slide Review  Normal smear    


 


APTT     (22-31)  SECONDS


 


D-Dimer, Quantitative    0.75 H  (0.19-0.50)  mg/L


 


Sodium   136   (136-145)  mEq/L


 


Potassium   4.5   (3.5-5.1)  mEq/L


 


Chloride   100   ()  mEq/L


 


Carbon Dioxide   20 L   (21-32)  mEq/L


 


Anion Gap   20.5 H   (5-15)  


 


BUN   19 H   (7-18)  mg/dL


 


Creatinine   1.0   (0.55-1.02)  mg/dL


 


Est Cr Clr Drug Dosing   55.54   mL/min


 


Estimated GFR (MDRD)   58   (>60)  mL/min


 


BUN/Creatinine Ratio   19.0 H   (14-18)  


 


Glucose   147 H   ()  mg/dL


 


Calcium   9.2   (8.5-10.1)  mg/dL


 


Magnesium   1.9   (1.8-2.4)  mg/dl


 


Total Bilirubin   1.0   (0.2-1.0)  mg/dL


 


AST   53 H   (15-37)  U/L


 


ALT   38   (14-59)  U/L


 


Alkaline Phosphatase   61   ()  U/L


 


Troponin I   2.825 H*   (0.00-0.056)  ng/mL


 


Total Protein   8.0   (6.4-8.2)  g/dl


 


Albumin   3.8   (3.4-5.0)  g/dl


 


Globulin   4.2   gm/dL


 


Albumin/Globulin Ratio   0.9 L   (1-2)  


 


TSH 3rd Generation   4.932 H   (0.358-3.74)  uIU/mL














  20 Range/Units





  16:50 


 


WBC   (3.98-10.04)  K/mm3


 


RBC   (3.98-5.22)  M/mm3


 


Hgb   (11.2-15.7)  gm/dl


 


Hct   (34.1-44.9)  %


 


MCV   (79.4-94.8)  fl


 


MCH   (25.6-32.2)  pg


 


MCHC   (32.2-35.5)  g/dl


 


RDW Std Deviation   (36.4-46.3)  fL


 


Plt Count   (182-369)  K/mm3


 


MPV   (9.4-12.3)  fl


 


Neut % (Auto)   (34.0-71.1)  %


 


Lymph % (Auto)   (19.3-51.7)  %


 


Mono % (Auto)   (4.7-12.5)  %


 


Eos % (Auto)   (0.7-5.8)  


 


Baso % (Auto)   (0.1-1.2)  %


 


Neut # (Auto)   (1.56-6.13)  K/mm3


 


Lymph # (Auto)   (1.18-3.74)  K/mm3


 


Mono # (Auto)   (0.24-0.36)  K/mm3


 


Eos # (Auto)   (0.04-0.36)  K/mm3


 


Baso # (Auto)   (0.01-0.08)  K/mm3


 


Manual Slide Review   


 


APTT  25  (22-31)  SECONDS


 


D-Dimer, Quantitative   (0.19-0.50)  mg/L


 


Sodium   (136-145)  mEq/L


 


Potassium   (3.5-5.1)  mEq/L


 


Chloride   ()  mEq/L


 


Carbon Dioxide   (21-32)  mEq/L


 


Anion Gap   (5-15)  


 


BUN   (7-18)  mg/dL


 


Creatinine   (0.55-1.02)  mg/dL


 


Est Cr Clr Drug Dosing   mL/min


 


Estimated GFR (MDRD)   (>60)  mL/min


 


BUN/Creatinine Ratio   (14-18)  


 


Glucose   ()  mg/dL


 


Calcium   (8.5-10.1)  mg/dL


 


Magnesium   (1.8-2.4)  mg/dl


 


Total Bilirubin   (0.2-1.0)  mg/dL


 


AST   (15-37)  U/L


 


ALT   (14-59)  U/L


 


Alkaline Phosphatase   ()  U/L


 


Troponin I   (0.00-0.056)  ng/mL


 


Total Protein   (6.4-8.2)  g/dl


 


Albumin   (3.4-5.0)  g/dl


 


Globulin   gm/dL


 


Albumin/Globulin Ratio   (1-2)  


 


TSH 3rd Generation   (0.358-3.74)  uIU/mL











Meds: 


Medications











Generic Name Dose Route Start Last Admin





  Trade Name Freq  PRN Reason Stop Dose Admin


 


Heparin Sodium/Dextrose  25,000 units in 500 mls @ 13.92 mls/hr  20 18:00

  20 18:15





  Heparin 25,000 Units In D5w 500 Ml  IV   696 units/hr





  TITRATE DENNIS   13.92 mls/hr





     Administration





     





  Protocol   





  696 UNITS/HR   


 


Magnesium Sulfate 2 gm/ Premix  50 mls @ 50 mls/hr  20 18:16  20 18:

30





  IV  20 19:15  50 mls/hr





  ONETIME STA   Administration





     





     





     





     














Discontinued Medications














Generic Name Dose Route Start Last Admin





  Trade Name Freq  PRN Reason Stop Dose Admin


 


Aspirin  324 mg  20 17:52  20 17:59





  Aspirin  PO  20 17:53  324 mg





  ONETIME STA   Administration





     





     





     





     


 


Al Hydroxide/Mg Hydroxide 30  0 ml  20 16:35  20 17:01





  ml/ Lidocaine HCl 15 ml  PO  20 16:36  45 ml





  ONETIME STA   Administration





     





     





     





     


 


Heparin Sodium (Porcine)  3,483 units  20 17:52  20 18:13





  Heparin Sodium  IVPUSH  20 17:53  3,483 units





  .BOLUS STA   Administration





     





     





     





     


 


Lactated Ringer's  1,000 mls @ 999 mls/hr  20 17:49  20 17:58





  Ringers, Lactated  IV  20 18:49  999 mls/hr





  .BOLUS ONE   Administration





     





     





     





     


 


Metoprolol Tartrate  5 mg  20 17:52  20 17:59





  Lopressor  IVPUSH  20 17:53  5 mg





  ONETIME ONE   Administration





     





     





     





     


 


Metoprolol Tartrate  5 mg  20 18:45  





  Lopressor  IVPUSH  20 18:46  





  ONETIME ONE   





     





     





     





     














- Re-Assessments/Exams


Free Text/Narrative Re-Assessment/Exam: 





20 16:29


The patient is reporting rapid palpitations whenever she moves around or gets up

, which suggests orthostasis, however, she also reports hot flashes on and off 

for the past few years, especially at night, and especially over the past 2 

nights.  She is also experiencing hot flashes here in the ED.  Her symptoms are 

most consistent with postmenopausal hot flashes, and could explain her sense of 

anxiety, dyspnea, lightheadedness, and diaphoresis, however, I want to rule out 

more serious conditions, such as a PE or acute cardiac event.  I have therefore 

ordered a work-up.  Lastly, she also reports epigastric burning, although has 

no abdominal or epigastric tenderness.  She may be suffering from GERD.  I have 

ordered a GI cocktail.








20 17:48


Two-view chest radiograph appears to be grossly normal.  The cardiac silhouette 

is within normal limits.  No pulmonary vascular congestion.  No pleural 

effusions.  No focal infiltrate.  No pneumothorax.  Formal read per the 

Radiologist pending.





The patient is orthostatic.





The patient CBC is remarkable for a WBC count mildly elevated at 10.61, but 

with a normal smear.  Her H/H is elevated at 18.5/52.6, with the remainder of 

her CBC being unremarkable.


Her CMP is remarkable for a bicarbonate depressed at 20 with an anion gap 

elevated at 20.5.  Her BUN is mildly elevated at 19 with a creatinine normal at 

1.0.  Her blood glucose is elevated at 147.  Her AST is mildly elevated at 53 

with an ALT normal at 38.  The remainder of her CMP is unremarkable.


Her magnesium is within normal limits at 1.9.


Her troponin is elevated at 2.825.


Her D-dimer is elevated at 0.75.


Her TSH is elevated at 4.932.





The patient's elevated troponin indicates a cardiac event.  I have ordered 4 

baby aspirin and IV Lopressor.  We will start the patient on a heparin drip per 

a non-STEMI protocol.








20 18:00


The above was discussed with the patient.  She prefers [facility].








20 18:22


Case discussed with Luther at [facility] at 18:00.





Case then discussed with Dr. Tripathi, Cardiologist on-call at [facility], 

at 18:04.  I texted her and image of the patient's ECG.  While she agrees that 

something cardiac is going on, she recommended that we rule out a PE with a CT 

angiogram before transferring.  She agreed with the treatment given so far, and 

recommended that we give 1 g of magnesium, since the patient's QTc prolonged.  

She then recommended a direct transfer to the Hospitalist.





Case then discussed with Dr. Alexander, Hospitalist at [facility], at 18:

17.  While I was talking to Dr. Alexander, I was handed a note that the patient 

has a history of hives with both oral and IV contrast.  This was relayed to Dr. Alexander, who recommended that we not proceed with the CT angiogram at this time

, rather, they will pretreat her and perform the CT angiogram there.  We will 

continue the heparin drip.  She accepted the patient for transfer to their 

facility.








20 18:46


The patient reports that the GI cocktail did not do anything, but that's 

because she only has epigastric pain when she gets up and moves around, and 

while lying on the gurney here in the ED, she was not having epigastric pain.








20 18:50


Chest x-ray images pushed to [facility] at 18:50.





Departure





- Departure


Time of Disposition: 18:26


Disposition: DC/Tfer to Acute Hospital 02


Reason for Transfer *Q: Primary PCI Indicated


Condition: Good


Clinical Impression: 


 Prolonged Q-T interval on ECG, Non-STEMI (non-ST elevated myocardial infarction

), Elevated TSH, Hyperglycemia, Elevated d-dimer, Orthostasis





Referrals: 


Brittney Sam PA-C [Primary Care Provider] - 


Forms:  ED Department Discharge





Sepsis Event Note





- Evaluation


Sepsis Screening Result: No Definite Risk





- Focused Exam


Vital Signs: 


 Vital Signs











  Pulse Pulse Resp BP BP Pulse Ox


 


 20 17:59  126 H    120/90  


 


 20 16:03   145 H  16   101/81  97











Date Exam was Performed: 20


Time Exam was Performed: 18:50





- My Orders


Last 24 Hours: 


My Active Orders





20 16:06


EKG 12 Lead [EKG Documentation Completion] [RC] STAT 





20 16:27


Chest 2V [CR] Stat 





20 16:28


Orthostatic Vital Signs [RC] STAT 





20 18:00


Heparin Sodium/D5W [Heparin 25,000 Units in D5W 500 ML] 25,000 units in 500 ml 

IV TITRATE 





20 18:16


Magnesium Sulfate/Water [Magnesium Sulfate in Water Premix] 2 gm   Premix Bag 1 

bag IV ONETIME 














- Assessment/Plan


Last 24 Hours: 


My Active Orders





20 16:06


EKG 12 Lead [EKG Documentation Completion] [RC] STAT 





20 16:27


Chest 2V [CR] Stat 





20 16:28


Orthostatic Vital Signs [RC] STAT 





20 18:00


Heparin Sodium/D5W [Heparin 25,000 Units in D5W 500 ML] 25,000 units in 500 ml 

IV TITRATE 





20 18:16


Magnesium Sulfate/Water [Magnesium Sulfate in Water Premix] 2 gm   Premix Bag 1 

bag IV ONETIME

## 2020-02-26 NOTE — CR
Chest: Two views of the chest were obtained.

 

Comparison: Prior chest x-ray of 09/18/19.

 

Heart size and mediastinum are normal.  Lungs are clear with no acute 

parenchymal change.  Bony structures are grossly intact.  Surgical 

clips are seen within the upper right abdomen.

 

Impression:

1.  Nothing acute is seen on two-view chest x-ray.

 

Diagnostic code #1

 

This report was dictated in [place] Standard Time

## 2021-05-14 ENCOUNTER — HOSPITAL ENCOUNTER (EMERGENCY)
Dept: HOSPITAL 41 - JD.ED | Age: 56
Discharge: HOME | End: 2021-05-14
Payer: MEDICAID

## 2021-05-14 VITALS — SYSTOLIC BLOOD PRESSURE: 124 MMHG | HEART RATE: 60 BPM | DIASTOLIC BLOOD PRESSURE: 61 MMHG

## 2021-05-14 DIAGNOSIS — R74.01: ICD-10-CM

## 2021-05-14 DIAGNOSIS — R07.9: Primary | ICD-10-CM

## 2021-05-14 DIAGNOSIS — Z87.891: ICD-10-CM

## 2021-05-14 DIAGNOSIS — Z79.899: ICD-10-CM

## 2021-05-14 DIAGNOSIS — Z88.1: ICD-10-CM

## 2021-05-14 DIAGNOSIS — E78.00: ICD-10-CM

## 2021-05-14 DIAGNOSIS — Z88.0: ICD-10-CM

## 2021-05-14 DIAGNOSIS — Z91.041: ICD-10-CM

## 2021-05-14 DIAGNOSIS — Z91.040: ICD-10-CM

## 2021-05-14 PROCEDURE — 71045 X-RAY EXAM CHEST 1 VIEW: CPT

## 2021-05-14 PROCEDURE — 80053 COMPREHEN METABOLIC PANEL: CPT

## 2021-05-14 PROCEDURE — 71275 CT ANGIOGRAPHY CHEST: CPT

## 2021-05-14 PROCEDURE — 36415 COLL VENOUS BLD VENIPUNCTURE: CPT

## 2021-05-14 PROCEDURE — 93005 ELECTROCARDIOGRAM TRACING: CPT

## 2021-05-14 PROCEDURE — 96374 THER/PROPH/DIAG INJ IV PUSH: CPT

## 2021-05-14 PROCEDURE — 85025 COMPLETE CBC W/AUTO DIFF WBC: CPT

## 2021-05-14 PROCEDURE — 84484 ASSAY OF TROPONIN QUANT: CPT

## 2021-05-14 PROCEDURE — 86140 C-REACTIVE PROTEIN: CPT

## 2021-05-14 PROCEDURE — 99285 EMERGENCY DEPT VISIT HI MDM: CPT

## 2021-05-14 PROCEDURE — 96375 TX/PRO/DX INJ NEW DRUG ADDON: CPT

## 2021-05-14 PROCEDURE — 85379 FIBRIN DEGRADATION QUANT: CPT

## 2021-05-14 PROCEDURE — 83735 ASSAY OF MAGNESIUM: CPT

## 2021-05-14 RX ADMIN — Medication PRN ML: at 11:42

## 2021-05-14 RX ADMIN — Medication PRN ML: at 11:11

## 2021-05-14 NOTE — EDM.PDOC
ED HPI GENERAL MEDICAL PROBLEM





- General


Chief Complaint: Chest Pain


Stated Complaint: CHEST PAIN


Time Seen by Provider: 21 10:17





- History of Present Illness


INITIAL COMMENTS - FREE TEXT/NARRATIVE: 





55-year-old female presents the emergency room with chest pain.





This chest pain is been worse today but has been coming and going.  Patient has 

a history of coronary artery disease and has had a prior MI in the past.  She ha

s also had a stroke.  This is worse with coughing change in position.  Its not a

deep pressure sensation.  However at times she has noticed the pain getting 

worse when she is going for a walk.  Today at work it got really bad after she 

coughed.  Patient is taking all her routine medications.  The patient quit 

smoking when she had her heart attack a little over a year ago.


  ** Epigastric


Pain Score (Numeric/FACES): 7





- Related Data


                                    Allergies











Allergy/AdvReac Type Severity Reaction Status Date / Time


 


amoxicillin Allergy  Rash Verified 21 10:01


 


hydrocortisone Allergy  Rash Verified 21 10:01


 


Iodinated Contrast Media Allergy  Hives Verified 21 10:01





[Iodinated Contrast- Oral     





and IV Dye]     


 


latex Allergy  Hives Verified 21 10:01











Home Meds: 


                                    Home Meds





traZODone 200 mg PO QPM 16 [History]


atorvaSTATin [Lipitor] 40 mg PO DAILY 21 [History]


carvediloL [Carvedilol] 6.25 mg PO BID 21 [History]


lisinopriL [Lisinopril] 2.5 mg PO DAILY 21 [History]











Past Medical History


HEENT History: Reports: Impaired Vision


Cardiovascular History: Reports: High Cholesterol


Respiratory History: Reports: None


Gastrointestinal History: Reports: Fatty Liver


OB/GYN History: Reports: Pregnancy


Musculoskeletal History: Reports: Arthritis


Neurological History: Reports: None


Psychiatric History: Reports: None, Other (See Below)


Endocrine/Metabolic History: Reports: None


Hematologic History: Reports: None


Immunologic History: Reports: None


Oncologic (Cancer) History: Reports: None


Dermatologic History: Reports: None





- Infectious Disease History


Infectious Disease History: Reports: None





- Past Surgical History


HEENT Surgical History: Reports: Oral Surgery, Tonsillectomy


GI Surgical History: Reports: Appendectomy, Cholecystectomy


Female  Surgical History: Reports:  Section, Hysterectomy, Nephrectomy


Other Female  Surgeries/Procedures: Left Kidney Removed





Social & Family History





- Tobacco Use


Tobacco Use Status *Q: Former Tobacco User


Used Tobacco, but Quit: Yes


Month/Year Tobacco Last Used: 2020





- Caffeine Use


Caffeine Use: Reports: Coffee





- Recreational Drug Use


Recreational Drug Use: No





- Living Situation & Occupation


Living situation: Reports: , with Family (Son)


Occupation: Unemployed





ED ROS GENERAL





- Review of Systems


Review Of Systems: See Below


Constitutional: Reports: No Symptoms


HEENT: Reports: No Symptoms


Respiratory: Reports: Cough


Cardiovascular: Reports: Chest Pain


Endocrine: Reports: No Symptoms


GI/Abdominal: Reports: No Symptoms


: Reports: No Symptoms


Musculoskeletal: Reports: No Symptoms


Skin: Reports: No Symptoms





ED EXAM, GENERAL





- Physical Exam


Exam: See Below


Exam Limited By: No Limitations


General Appearance: Alert, No Apparent Distress


Head: Atraumatic, Normocephalic


Neck: Normal Inspection, Supple, Non-Tender, Full Range of Motion


Respiratory/Chest: No Respiratory Distress, Lungs Clear, Normal Breath Sounds


Cardiovascular: Regular Rate, Rhythm, No Edema, No Murmur


GI/Abdominal: Normal Bowel Sounds, Soft, Non-Tender


Back Exam: Normal Inspection.  No: CVA Tenderness (L), CVA Tenderness (R)


Extremities: Normal Inspection, No Pedal Edema


Neurological: Alert, Oriented, Normal Cognition


  ** #1 Interpretation


EKG Date: 21


Rhythm: NSR


Axis: Normal


P-Wave: Present


QRS: Other (Normal other than some suggestion of late transition)


ST-T: Normal


QT: Normal


Comparison: Change From Previous EKG (Last EKG she was in a sinus tach with 

significant T wave inversion this was all resolved)





Course





- Vital Signs


Last Recorded V/S: 


                                Last Vital Signs











Temp  36.7 C   21 14:34


 


Pulse  60   21 14:34


 


Resp  12   21 09:52


 


BP  124/61   21 14:34


 


Pulse Ox  100   21 14:34














- Orders/Labs/Meds


Orders: 


                               Active Orders 24 hr











 Category Date Time Status


 


 HEPATITIS PANEL (4) [REF] Stat Lab  21 13:50 Ordered


 


 EKG 12 Lead [EK] Stat Ther  21 09:59 Ordered











Labs: 


                                Laboratory Tests











  21 Range/Units





  10:10 10:10 10:10 


 


WBC  5.77    (3.98-10.04)  K/mm3


 


RBC  4.10    (3.98-5.22)  M/mm3


 


Hgb  12.8  D    (11.2-15.7)  gm/dl


 


Hct  38.8    (34.1-44.9)  %


 


MCV  94.6    (79.4-94.8)  fl


 


MCH  31.2    (25.6-32.2)  pg


 


MCHC  33.0    (32.2-35.5)  g/dl


 


RDW Std Deviation  50.3 H    (36.4-46.3)  fL


 


Plt Count  194  D    (182-369)  K/mm3


 


MPV  10.6    (9.4-12.3)  fl


 


Neut % (Auto)  56.9    (34.0-71.1)  %


 


Lymph % (Auto)  26.2    (19.3-51.7)  %


 


Mono % (Auto)  14.0 H    (4.7-12.5)  %


 


Eos % (Auto)  2.6    (0.7-5.8)  


 


Baso % (Auto)  0.3    (0.1-1.2)  %


 


Neut # (Auto)  3.28    (1.56-6.13)  K/mm3


 


Lymph # (Auto)  1.51    (1.18-3.74)  K/mm3


 


Mono # (Auto)  0.81 H    (0.24-0.36)  K/mm3


 


Eos # (Auto)  0.15    (0.04-0.36)  K/mm3


 


Baso # (Auto)  0.02    (0.01-0.08)  K/mm3


 


D-Dimer, Quantitative   0.64 H   (0.19-0.50)  mg/L


 


Sodium    137  (136-145)  mEq/L


 


Potassium    3.9  (3.5-5.1)  mEq/L


 


Chloride    104  ()  mEq/L


 


Carbon Dioxide    23  (21-32)  mEq/L


 


Anion Gap    13.9  (5-15)  


 


BUN    16  (7-18)  mg/dL


 


Creatinine    0.9  (0.55-1.02)  mg/dL


 


Est Cr Clr Drug Dosing    60.99  mL/min


 


Estimated GFR (MDRD)    > 60  (>60)  mL/min


 


BUN/Creatinine Ratio    17.8  (14-18)  


 


Glucose    100 H  (70-99)  mg/dL


 


Calcium    8.5  (8.5-10.1)  mg/dL


 


Magnesium    1.9  (1.8-2.4)  mg/dL


 


Total Bilirubin    1.1 H  (0.2-1.0)  mg/dL


 


AST    535 H  (15-37)  U/L


 


ALT    638 H  (14-59)  U/L


 


Alkaline Phosphatase    111  ()  U/L


 


Troponin I    < 0.017  (0.00-0.056)  ng/mL


 


C-Reactive Protein    0.6  (<1.0)  mg/dL


 


Total Protein    7.4  (6.4-8.2)  g/dl


 


Albumin    3.2 L  (3.4-5.0)  g/dl


 


Globulin    4.2  gm/dL


 


Albumin/Globulin Ratio    0.8 L  (1-2)  














  // Range/Units





  12:50 


 


WBC   (3.98-10.04)  K/mm3


 


RBC   (3.98-5.22)  M/mm3


 


Hgb   (11.2-15.7)  gm/dl


 


Hct   (34.1-44.9)  %


 


MCV   (79.4-94.8)  fl


 


MCH   (25.6-32.2)  pg


 


MCHC   (32.2-35.5)  g/dl


 


RDW Std Deviation   (36.4-46.3)  fL


 


Plt Count   (182-369)  K/mm3


 


MPV   (9.4-12.3)  fl


 


Neut % (Auto)   (34.0-71.1)  %


 


Lymph % (Auto)   (19.3-51.7)  %


 


Mono % (Auto)   (4.7-12.5)  %


 


Eos % (Auto)   (0.7-5.8)  


 


Baso % (Auto)   (0.1-1.2)  %


 


Neut # (Auto)   (1.56-6.13)  K/mm3


 


Lymph # (Auto)   (1.18-3.74)  K/mm3


 


Mono # (Auto)   (0.24-0.36)  K/mm3


 


Eos # (Auto)   (0.04-0.36)  K/mm3


 


Baso # (Auto)   (0.01-0.08)  K/mm3


 


D-Dimer, Quantitative   (0.19-0.50)  mg/L


 


Sodium   (136-145)  mEq/L


 


Potassium   (3.5-5.1)  mEq/L


 


Chloride   ()  mEq/L


 


Carbon Dioxide   (21-32)  mEq/L


 


Anion Gap   (5-15)  


 


BUN   (7-18)  mg/dL


 


Creatinine   (0.55-1.02)  mg/dL


 


Est Cr Clr Drug Dosing   mL/min


 


Estimated GFR (MDRD)   (>60)  mL/min


 


BUN/Creatinine Ratio   (14-18)  


 


Glucose   (70-99)  mg/dL


 


Calcium   (8.5-10.1)  mg/dL


 


Magnesium   (1.8-2.4)  mg/dL


 


Total Bilirubin   (0.2-1.0)  mg/dL


 


AST   (15-37)  U/L


 


ALT   (14-59)  U/L


 


Alkaline Phosphatase   ()  U/L


 


Troponin I  < 0.017  (0.00-0.056)  ng/mL


 


C-Reactive Protein   (<1.0)  mg/dL


 


Total Protein   (6.4-8.2)  g/dl


 


Albumin   (3.4-5.0)  g/dl


 


Globulin   gm/dL


 


Albumin/Globulin Ratio   (1-2)  











Meds: 


Medications














Discontinued Medications














Generic Name Dose Route Start Last Admin





  Trade Name Freq  PRN Reason Stop Dose Admin


 


Aspirin  324 mg  21 10:48  21 11:01





  Aspirin 81 Mg Tab.Chew  PO  21 10:49  324 mg





  ONETIME ONE   Administration


 


Diphenhydramine HCl  50 mg  21 10:57  21 11:05





  Diphenhydramine 50 Mg/Ml Sdv  IVPUSH  21 10:58  50 mg





  ONETIME ONE   Administration


 


Famotidine  40 mg  21 10:57  21 11:06





  Famotidine 20 Mg/2 Ml Sdv  IVPUSH  21 10:58  40 mg





  ONETIME ONE   Administration


 


Sodium Chloride  1,000 mls @ 125 mls/hr  21 10:45  21 11:07





  Normal Saline  IV   125 mls/hr





  ASDIRECTED DENNIS   Administration


 


Sodium Chloride  100 mls @ 75 mls/hr  21 11:00  21 11:42





  Normal Saline  IV   75 mls/hr





  ASDIRECTED DENNIS   Administration


 


Iopamidol  100 ml  21 10:53  21 11:42





  Iopamidol 755 Mg/Ml 100 Ml Bottle  IVPUSH  21 10:54  100 ml





  ONETIME ONE   Administration


 


Sodium Chloride  10 ml  21 10:53  21 11:42





  Sodium Chloride 0.9% 10 Ml Syringe  FLUSH   10 ml





  ONETIME PRN   Administration





  IV FLUSH  














- Re-Assessments/Exams


Free Text/Narrative Re-Assessment/Exam: 





21 10:49


We will give the patient aspirin she is not actively having chest pain at this 

time when she moves.  Her D-dimer did come back slightly elevated will check a 

pulmonary CTA.  EKG is nondiagnostic


21 12:12


Patient was pretreated with Benadryl and famotidine prior to receiving contrast 

that she has had a history of a iodine reaction in the past.  CTA is negative 

for blood clot or any other abnormality at this point.  At this point ready to 

check a second troponin make sure this is okay.


21 13:49


This second troponin is normal.  Will discharge home.





The patient is going to follow-up with her regular provider for this chest pain 

and her transaminase elevation.  We will order a hepatitis panel.  Strong 

consideration should be given to possibly backing off her atorvastatin.








21 16:59


Notified by lab that they do not have enough blood to do the hepatitis panel.





Departure





- Departure


Time of Disposition: 13:52


Disposition: Home, Self-Care 01


Clinical Impression: 


 Chest pain, Elevated transaminase level








- Discharge Information


Instructions:  Nonspecific Chest Pain, Adult, Easy-to-Read


Referrals: 


Brittney Sam PA-C [Primary Care Provider] - 


Forms:  ED Department Discharge


Additional Instructions: 


Return to the emergency room with any questions problems worsening symptoms.





Follow-up with your regular healthcare provider early this next week.





Sepsis Event Note (ED)





- Evaluation


Sepsis Screening Result: No Definite Risk





- Focused Exam


Vital Signs: 


                                   Vital Signs











  Temp Pulse Resp BP Pulse Ox


 


 21 14:34  36.7 C  60   124/61  100


 


 21 09:52  36.6 C  58 L  12  122/69  97














- My Orders


Last 24 Hours: 


My Active Orders





21 09:59


EKG 12 Lead [EK] Stat 





21 13:50


HEPATITIS PANEL (4) [REF] Stat 














- Assessment/Plan


Last 24 Hours: 


My Active Orders





21 09:59


EKG 12 Lead [EK] Stat 





21 13:50


HEPATITIS PANEL (4) [REF] Stat

## 2021-05-14 NOTE — CR
Chest: Portable view of the chest was obtained.

 

Comparison: Prior chest x-ray of 02/25/20.

 

Heart size and mediastinum are normal.  Lungs are clear with no acute 

parenchymal change.  No acute osseous abnormality is appreciated.

 

Impression:

1.  Nothing acute is seen on portable chest x-ray.

 

Diagnostic code #1

## 2021-05-14 NOTE — CT
CT chest

 

Technique: Multiple axial sections through the chest were obtained.  

Intravenous contrast was utilized.  Study has been performed as a 

pulmonary angiogram protocol.

 

Findings: Pulmonary arteries are well opacified.  No filling defects 

are seen to indicate pulmonary embolism.

 

Thoracic aorta shows mild atherosclerotic change.  No aneurysm is seen

 within the thoracic aorta.  No pericardial thickening is seen.  Small

 portion of the visualized upper abdominal structures show a right 

kidney.  Left kidney is not visualized.  Surgical clips are seen from 

prior cholecystectomy.  Several calcifications are seen within the 

spleen which are likely incidental.

 

Lung window settings were reviewed which show no acute parenchymal 

abnormality.  No pleural effusions or pneumothorax is seen.

 

Bone window settings were reviewed.  No acute osseous abnormality is 

appreciated.  Minimal degenerative change is scattered within the 

spine.

 

Impression:

1.  No findings of pulmonary embolism.

2.  Right kidney is seen with no left kidney being visualized on this 

exam.

3.  Other incidental findings as noted above.  Nothing acute is seen.

 

Diagnostic code #2

## 2021-12-15 ENCOUNTER — HOSPITAL ENCOUNTER (EMERGENCY)
Dept: HOSPITAL 41 - JD.ED | Age: 56
Discharge: HOME | End: 2021-12-15
Payer: MEDICAID

## 2021-12-15 VITALS — SYSTOLIC BLOOD PRESSURE: 116 MMHG | DIASTOLIC BLOOD PRESSURE: 74 MMHG | HEART RATE: 75 BPM

## 2021-12-15 DIAGNOSIS — Z91.041: ICD-10-CM

## 2021-12-15 DIAGNOSIS — Z91.040: ICD-10-CM

## 2021-12-15 DIAGNOSIS — N39.0: Primary | ICD-10-CM

## 2021-12-15 DIAGNOSIS — Z79.899: ICD-10-CM

## 2021-12-15 DIAGNOSIS — Z88.8: ICD-10-CM

## 2021-12-15 DIAGNOSIS — Z88.0: ICD-10-CM

## 2021-12-15 DIAGNOSIS — E78.00: ICD-10-CM

## 2021-12-15 DIAGNOSIS — Z79.82: ICD-10-CM

## 2021-12-15 PROCEDURE — 96365 THER/PROPH/DIAG IV INF INIT: CPT

## 2021-12-15 PROCEDURE — 36415 COLL VENOUS BLD VENIPUNCTURE: CPT

## 2021-12-15 PROCEDURE — 87086 URINE CULTURE/COLONY COUNT: CPT

## 2021-12-15 PROCEDURE — 96375 TX/PRO/DX INJ NEW DRUG ADDON: CPT

## 2021-12-15 PROCEDURE — 80053 COMPREHEN METABOLIC PANEL: CPT

## 2021-12-15 PROCEDURE — 81001 URINALYSIS AUTO W/SCOPE: CPT

## 2021-12-15 PROCEDURE — 74176 CT ABD & PELVIS W/O CONTRAST: CPT

## 2021-12-15 PROCEDURE — 86140 C-REACTIVE PROTEIN: CPT

## 2021-12-15 PROCEDURE — 99284 EMERGENCY DEPT VISIT MOD MDM: CPT

## 2021-12-15 PROCEDURE — 85025 COMPLETE CBC W/AUTO DIFF WBC: CPT

## 2021-12-15 NOTE — EDM.PDOC
ED HPI GENERAL MEDICAL PROBLEM





- General


Chief Complaint: Genitourinary Problem


Stated Complaint: UTI


Time Seen by Provider: 12/15/21 11:47


Source of Information: Reports: Patient, RN Notes Reviewed


History Limitations: Reports: No Limitations





- History of Present Illness


INITIAL COMMENTS - FREE TEXT/NARRATIVE: 





Patient is a 55-year-old female who presents to the ER for evaluation of her 

ongoing UTI.  States that she has been having issues with multiple UTIs.  This 

is the third infection she has had in the last 3 weeks.  States she has been 

placed on cefdinir, fluconazole in case she gets a yeast infection, and has been

taking phenazopyridine, for the last 2 days, 3 times a day.  States that she 

does only have 1 kidney so she is concerned that it might be causing some damage

to her kidney.  She did become nauseous today, and has vomited.  She is not 

having any documented fevers but she did state that she felt really hot, and 

then felt very chilled.  States that she has a appointment with the urologist 

but this is not until .  States that she has had  some sort of stones 

in the past, and was wondering if this was not the cause of her issues again at 

this time.  Patient not have any cough or shortness of breath or any sort of 

diarrhea.  Primary care provider is Starr Chavarria.


  ** Bladder


Pain Score (Numeric/FACES): 0





- Related Data


                                    Allergies











Allergy/AdvReac Type Severity Reaction Status Date / Time


 


amoxicillin Allergy  Rash Verified 21 10:01


 


hydrocortisone Allergy  Rash Verified 21 10:01


 


Iodinated Contrast Media Allergy  Hives Verified 21 10:01





[Iodinated Contrast- Oral     





and IV Dye]     


 


latex Allergy  Hives Verified 21 10:01











Home Meds: 


                                    Home Meds





traZODone 200 mg PO QPM 16 [History]


atorvaSTATin [Lipitor] 40 mg PO DAILY 21 [History]


carvediloL [Carvedilol] 6.25 mg PO BID 21 [History]


lisinopriL [Lisinopril] 2.5 mg PO DAILY 21 [History]


Aspirin 81 mg PO 21 [History]


Cholecalciferol (Vitamin D3) [Vitamin D3] 1,000 unit PO 21 [History]


Docusate Sodium [Colace] 100 mg PO DAILY 21 [History]


L.acidoph,Paracasei, B.lactis [Probiotic] 1 each PO 21 [History]


LORazepam [Lorazepam] 1 mg PO 21 [History]


Spironolactone [Aldactone] 25 mg PO 21 [History]


Triamcinolone Acetonide [Triamcinolone Acetonide Lotion] 60 ml .XX 21 

[History]


carvediloL [Carvedilol] 6.25 mg PO 21 [History]


traZODone 50 mg PO DAILY 21 [History]


levoFLOXacin [Levofloxacin] 500 mg PO DAILY 7 Days #7 tablet 12/15/21 [Rx]











Past Medical History


HEENT History: Reports: Impaired Vision


Cardiovascular History: Reports: High Cholesterol


Gastrointestinal History: Reports: Fatty Liver


Genitourinary History: Reports: Other (See Below) (states that she only has one 

kidney)


OB/GYN History: Reports: Pregnancy


Musculoskeletal History: Reports: Arthritis





- Past Surgical History


HEENT Surgical History: Reports: Oral Surgery, Tonsillectomy


GI Surgical History: Reports: Appendectomy, Cholecystectomy


Female  Surgical History: Reports:  Section, Hysterectomy, Nephrectomy


Other Female  Surgeries/Procedures: Left Kidney Removed





Social & Family History





- Tobacco Use


Tobacco Use Status *Q: Never Tobacco User





- Caffeine Use


Caffeine Use: Reports: None





- Recreational Drug Use


Recreational Drug Use: No





- Living Situation & Occupation


Living situation: Reports: , with Family (Son)


Occupation: Unemployed





ED ROS GENERAL





- Review of Systems


Review Of Systems: Comprehensive ROS is negative, except as noted in HPI.





ED EXAM, RENAL/





- Physical Exam


Exam: See Below


Exam Limited By: No Limitations


General Appearance: Alert, WD/WN, No Apparent Distress


Respiratory/Chest: No Respiratory Distress, Lungs Clear, Normal Breath Sounds, 

No Accessory Muscle Use, Chest Non-Tender


Cardiovascular: Normal Peripheral Pulses, Regular Rate, Rhythm, No Edema


GI/Abdominal: Normal Bowel Sounds, Soft, No Distention, No Mass, Tender (slight 

generalized)


Extremities: Normal Inspection, Normal Capillary Refill


Neurological: Alert, Oriented, Normal Cognition, No Motor/Sensory Deficits


Psychiatric: Normal Affect, Normal Mood


Skin Exam: Warm, Dry, Intact, Normal Color, No Rash





Course





- Vital Signs


Last Recorded V/S: 


                                Last Vital Signs











Temp  97.0 F   12/15/21 11:57


 


Pulse  75   12/15/21 11:57


 


Resp  20   12/15/21 11:57


 


BP  116/74   12/15/21 11:57


 


Pulse Ox  95   12/15/21 11:57














- Orders/Labs/Meds


Orders: 


                               Active Orders 24 hr











 Category Date Time Status


 


 Peripheral IV Care [RC] .AS DIRECTED Care  12/15/21 11:55 Ordered


 


 CULTURE URINE [MREF] Urgent Lab  12/15/21 12:07 Ordered


 


 Levofloxacin/Dextrose 5%-Water [Levaquin in D5W 500 MG/ Med  12/15/21 13:32 

Ordered





 100 ML] 500 mg   





 Premix Bag 1 bag   





 IV ONETIME   


 


 Sodium Chloride 0.9% [Saline Flush] Med  12/15/21 11:55 Ordered





 10 ml FLUSH ASDIRECTED PRN   


 


 Peripheral IV Insertion Adult [OM.PC] Routine Oth  12/15/21 11:55 Ordered








                                Medication Orders





Levofloxacin/Dextrose 500 mg/ (Premix)  100 mls @ 100 mls/hr IV ONETIME ONE


   Stop: 12/15/21 14:31


Sodium Chloride (Sodium Chloride 0.9% 10 Ml Syringe)  10 ml FLUSH ASDIRECTED PRN


   PRN Reason: Keep Vein Open


   Last Admin: 12/15/21 12:31  Dose: 10 ml


   Documented by: KIMBERLY








Labs: 


                                Laboratory Tests











  12/15/21 12/15/21 12/15/21 Range/Units





  11:44 12:23 12:23 


 


WBC   6.75   (3.98-10.04)  K/mm3


 


RBC   4.26   (3.98-5.22)  M/mm3


 


Hgb   13.1   (11.2-15.7)  gm/dl


 


Hct   40.3   (34.1-44.9)  %


 


MCV   94.6   (79.4-94.8)  fl


 


MCH   30.8   (25.6-32.2)  pg


 


MCHC   32.5   (32.2-35.5)  g/dl


 


RDW Std Deviation   41.6   (36.4-46.3)  fL


 


Plt Count   220   (182-369)  K/mm3


 


MPV   9.7   (9.4-12.3)  fl


 


Neut % (Auto)   62.9   (34.0-71.1)  %


 


Lymph % (Auto)   25.9   (19.3-51.7)  %


 


Mono % (Auto)   9.6   (4.7-12.5)  %


 


Eos % (Auto)   1.3   (0.7-5.8)  


 


Baso % (Auto)   0.3   (0.1-1.2)  %


 


Neut # (Auto)   4.24   (1.56-6.13)  K/mm3


 


Lymph # (Auto)   1.75   (1.18-3.74)  K/mm3


 


Mono # (Auto)   0.65 H   (0.24-0.36)  K/mm3


 


Eos # (Auto)   0.09   (0.04-0.36)  K/mm3


 


Baso # (Auto)   0.02   (0.01-0.08)  K/mm3


 


Sodium    139  (136-145)  mEq/L


 


Potassium    4.5  (3.5-5.1)  mEq/L


 


Chloride    103  ()  mEq/L


 


Carbon Dioxide    31  (21-32)  mEq/L


 


Anion Gap    9.5  (5-15)  


 


BUN    19 H  (7-18)  mg/dL


 


Creatinine    0.8  (0.55-1.02)  mg/dL


 


Est Cr Clr Drug Dosing    68.61  mL/min


 


Estimated GFR (MDRD)    > 60  (>60)  mL/min


 


BUN/Creatinine Ratio    23.8 H  (14-18)  


 


Glucose    89  (70-99)  mg/dL


 


Calcium    8.8  (8.5-10.1)  mg/dL


 


Total Bilirubin    0.5  (0.2-1.0)  mg/dL


 


AST    31  (15-37)  U/L


 


ALT    41  (14-59)  U/L


 


Alkaline Phosphatase    44 L  ()  U/L


 


C-Reactive Protein    0.2  (<1.0)  mg/dL


 


Total Protein    7.7  (6.4-8.2)  g/dl


 


Albumin    3.8  (3.4-5.0)  g/dl


 


Globulin    3.9  gm/dL


 


Albumin/Globulin Ratio    1.0  (1-2)  


 


Urine Color  Orange H    (Yellow)  


 


Urine Appearance  Clear    (Clear)  


 


Urine pH  5.5    (5.0-8.0)  


 


Ur Specific Gravity  1.025    (1.005-1.030)  


 


Urine Protein  1+ H    (Negative)  


 


Urine Glucose (UA)  Trace H    (Negative)  


 


Urine Ketones  Negative    (Negative)  


 


Urine Occult Blood  Negative    (Negative)  


 


Urine Nitrite  Positive H    (Negative)  


 


Urine Bilirubin  Negative    (Negative)  


 


Urine Urobilinogen  1.0    (0.2-1.0)  


 


Ur Leukocyte Esterase  Negative    (Negative)  


 


Urine RBC  0-5    (0-5)  /hpf


 


Urine WBC  0-5    (0-5)  /hpf


 


Ur Squamous Epith Cells  0-5    (0-5)  /hpf


 


Urine Bacteria  Moderate H    (FEW)  /hpf


 


Urine Mucus  Few    (FEW)  /hpf











Meds: 


Medications











Generic Name Dose Route Start Last Admin





  Trade Name Freq  PRN Reason Stop Dose Admin


 


Levofloxacin/Dextrose 500 mg/  100 mls @ 100 mls/hr  12/15/21 13:32 





  Premix  IV  12/15/21 14:31 





  ONETIME ONE  


 


Sodium Chloride  10 ml  12/15/21 11:55  12/15/21 12:31





  Sodium Chloride 0.9% 10 Ml Syringe  FLUSH   10 ml





  ASDIRECTED PRN   Administration





  Keep Vein Open  














Discontinued Medications














Generic Name Dose Route Start Last Admin





  Trade Name Freq  PRN Reason Stop Dose Admin


 


Sodium Chloride  1,000 mls @ 999 mls/hr  12/15/21 11:55  12/15/21 12:31





  Normal Saline  IV  12/15/21 12:55  999 mls/hr





  ONETIME ONE   Administration


 


Ondansetron HCl  4 mg  12/15/21 12:06  12/15/21 12:31





  Ondansetron 4 Mg/2 Ml Sdv  IVPUSH  12/15/21 12:07  4 mg





  ONETIME ONE   Administration














- Re-Assessments/Exams


Free Text/Narrative Re-Assessment/Exam: 





12/15/21 12:11


Patient presents to the ER for the evaluation of her ongoing UTI.  She states 

she is worried due to having only one kidney and taking multiple doses of 

phenazopyridine that she has caused issues with her kidney.  We will get IV 

started get some fluids going give her some nausea meds, get some labs and 

abdomen pelvis CT without contrast for ongoing management.





12/15/21 13:34


CT is unremarkable for any acute findings.  There was a slight abnormality 

appearing in the posterior liver which could represent vascular confluence and a

 contrast-enhanced CT is recommended however the patient is allergic to 

contrast, and she is not having any pain in her liver and her liver enzymes look

 okay so we will have her follow-up with her regular care provider for this.  

Patient will be given 1 dose of IV Levaquin for ongoing issues.  She will need 

to stop the cefdinir, phenazopyridine and follow-up with her urologist at her 

next visit.





Departure





- Departure


Time of Disposition: 13:35


Disposition: Home, Self-Care 01


Condition: Good


Clinical Impression: 


 UTI, Urinary tract infectious disease








- Discharge Information


*PRESCRIPTION DRUG MONITORING PROGRAM REVIEWED*: No


*COPY OF PRESCRIPTION DRUG MONITORING REPORT IN PATIENT BRODY: No


Prescriptions: 


levoFLOXacin [Levofloxacin] 500 mg PO DAILY 7 Days #7 tablet


Instructions:  Urinary Tract Infection, Adult, Easy-to-Read


Referrals: 


Starr Chavarria NP [Ordering Only Provider] - 


Forms:  ED Department Discharge, ED Return to Work/School Form


Additional Instructions: 


You have been evaluated in the ED for your urinary symptoms.





You had some labs taken, and a CT performed at today's visit and everything was 

within normal limits for today's purposes.  You have been given a dose of IV 

Levaquin in the ER for ongoing management of UTI; and have been placed on oral 

Levaquin.  Please take 1 tablet daily until gone.  This will be for the next 7 

days.  You do not need to start the oral medication until tomorrow.  This 

antibiotic should start providing coverage within 24 hours and you should 

hopefully get symptomatic relief in that timeframe.  This medication was 

electronically sent to the [facility] located near [facility].





Discontinue use of the cefdinir antibiotic they placed you on, as well as the 

phenazopyridine, "the orange pill".





Please increase your oral fluid intake and try to stay adequately hydrated.





Follow-up with urology at your next scheduled visit and take this visit packet 

with you so they have the labs and CT report.  Otherwise you may call our  IM 

department and have them release your records to your urologist at your earliest

convenience.





Please return to the ED if your symptoms change or worsen.





Sepsis Event Note (ED)





- Focused Exam


Vital Signs: 


                                   Vital Signs











  Temp Pulse Resp BP Pulse Ox


 


 12/15/21 11:57  97.0 F  75  20  116/74  95














- My Orders


Last 24 Hours: 


My Active Orders





12/15/21 11:55


Peripheral IV Care [RC] .AS DIRECTED 


Sodium Chloride 0.9% [Saline Flush]   10 ml FLUSH ASDIRECTED PRN 


Peripheral IV Insertion Adult [OM.PC] Routine 





12/15/21 12:07


CULTURE URINE [MREF] Urgent 





12/15/21 13:32


Levofloxacin/Dextrose 5%-Water [Levaquin in D5W 500 MG/100 ML] 500 mg   Premix 

Bag 1 bag IV ONETIME 














- Assessment/Plan


Last 24 Hours: 


My Active Orders





12/15/21 11:55


Peripheral IV Care [RC] .AS DIRECTED 


Sodium Chloride 0.9% [Saline Flush]   10 ml FLUSH ASDIRECTED PRN 


Peripheral IV Insertion Adult [OM.PC] Routine 





12/15/21 12:07


CULTURE URINE [MREF] Urgent 





12/15/21 13:32


Levofloxacin/Dextrose 5%-Water [Levaquin in D5W 500 MG/100 ML] 500 mg   Premix 

Bag 1 bag IV ONETIME

## 2021-12-15 NOTE — CT
CT abdomen and pelvis

 

Technique: Multiple axial sections were obtained from above the dome 

of the diaphragm inferiorly through the pubic symphysis.  Intravenous 

and oral contrast were not utilized.

 

Comparison: Prior CT abdomen and pelvis study of 09/02/09

 

Findings: Visualized lung bases show nothing acute.

 

Right kidney is seen.  Right kidney shows no abnormal calcifications. 

 Right ureter shows no dilatation.  No abnormal calcifications are 

seen within the right ureter.  Left kidney is missing.

 

Noncontrast appearance of the liver shows decreased density 

posteriorly.  This is most likely due to vascular confluence although 

contrast-enhanced CT is recommended to confirm.

 

Spleen size shows several calcifications which are felt to be 

incidental.  Adrenal glands show no nodule.  Surgical clips are seen 

from prior cholecystectomy.  Pancreas is within normal limits.  Aorta 

shows atherosclerotic calcification which continues into the iliac 

vessels.  No aneurysm is seen.  No retroperitoneal adenopathy or 

mesenteric abnormalities are seen.  Appendix is not visualized.  No 

pelvic mass or adenopathy is seen.

 

Slight increased stool is noted throughout the colon.

 

Bone window settings were reviewed which show mild scoliosis.  No 

acute osseous finding is seen.

 

Impression:

1.  Single right kidney which shows no abnormal calcifications, 

ureteral dilatation or ureteral stone.

2.  Slight increased stool is seen throughout the colon.

3.  Slightly abnormal appearing posterior liver which could represent 

vascular confluence although contrast-enhanced CT study is recommended

 to confirm. 

 

Diagnostic code #3

## 2022-07-14 ENCOUNTER — HOSPITAL ENCOUNTER (EMERGENCY)
Dept: HOSPITAL 41 - JD.ED | Age: 57
Discharge: HOME | End: 2022-07-14
Payer: MEDICAID

## 2022-07-14 VITALS — DIASTOLIC BLOOD PRESSURE: 82 MMHG | HEART RATE: 78 BPM | SYSTOLIC BLOOD PRESSURE: 115 MMHG

## 2022-07-14 DIAGNOSIS — K21.9: Primary | ICD-10-CM

## 2022-07-14 DIAGNOSIS — Z79.899: ICD-10-CM

## 2022-07-14 DIAGNOSIS — Z91.041: ICD-10-CM

## 2022-07-14 DIAGNOSIS — Z88.0: ICD-10-CM

## 2022-07-14 DIAGNOSIS — E78.00: ICD-10-CM

## 2022-07-14 DIAGNOSIS — Z91.040: ICD-10-CM

## 2022-07-14 DIAGNOSIS — Z79.82: ICD-10-CM

## 2022-07-14 DIAGNOSIS — Z88.5: ICD-10-CM

## 2022-07-14 DIAGNOSIS — Z20.822: ICD-10-CM

## 2022-07-14 LAB — EGFRCR SERPLBLD CKD-EPI 2021: 75 ML/MIN (ref 60–?)

## 2022-07-14 PROCEDURE — 84484 ASSAY OF TROPONIN QUANT: CPT

## 2022-07-14 PROCEDURE — 71046 X-RAY EXAM CHEST 2 VIEWS: CPT

## 2022-07-14 PROCEDURE — U0002 COVID-19 LAB TEST NON-CDC: HCPCS

## 2022-07-14 PROCEDURE — 85025 COMPLETE CBC W/AUTO DIFF WBC: CPT

## 2022-07-14 PROCEDURE — 93005 ELECTROCARDIOGRAM TRACING: CPT

## 2022-07-14 PROCEDURE — 80053 COMPREHEN METABOLIC PANEL: CPT

## 2022-07-14 PROCEDURE — 85379 FIBRIN DEGRADATION QUANT: CPT

## 2022-07-14 PROCEDURE — 87635 SARS-COV-2 COVID-19 AMP PRB: CPT

## 2022-07-14 PROCEDURE — 99284 EMERGENCY DEPT VISIT MOD MDM: CPT

## 2022-07-14 PROCEDURE — 96374 THER/PROPH/DIAG INJ IV PUSH: CPT

## 2022-07-14 PROCEDURE — 36415 COLL VENOUS BLD VENIPUNCTURE: CPT
